# Patient Record
Sex: MALE | Race: WHITE | HISPANIC OR LATINO | URBAN - METROPOLITAN AREA
[De-identification: names, ages, dates, MRNs, and addresses within clinical notes are randomized per-mention and may not be internally consistent; named-entity substitution may affect disease eponyms.]

---

## 2020-01-01 ENCOUNTER — EMERGENCY (EMERGENCY)
Facility: HOSPITAL | Age: 30
LOS: 1 days | Discharge: HOME | End: 2020-01-01
Attending: EMERGENCY MEDICINE | Admitting: EMERGENCY MEDICINE
Payer: SELF-PAY

## 2020-01-01 VITALS
HEIGHT: 65 IN | HEART RATE: 81 BPM | SYSTOLIC BLOOD PRESSURE: 127 MMHG | OXYGEN SATURATION: 98 % | WEIGHT: 145.06 LBS | RESPIRATION RATE: 18 BRPM | DIASTOLIC BLOOD PRESSURE: 78 MMHG | TEMPERATURE: 98 F

## 2020-01-01 DIAGNOSIS — S01.81XA LACERATION WITHOUT FOREIGN BODY OF OTHER PART OF HEAD, INITIAL ENCOUNTER: ICD-10-CM

## 2020-01-01 PROCEDURE — 12013 RPR F/E/E/N/L/M 2.6-5.0 CM: CPT

## 2020-01-01 PROCEDURE — 71045 X-RAY EXAM CHEST 1 VIEW: CPT

## 2020-01-01 PROCEDURE — 70450 CT HEAD/BRAIN W/O DYE: CPT

## 2020-01-01 PROCEDURE — 99284 EMERGENCY DEPT VISIT MOD MDM: CPT | Mod: 25

## 2020-01-01 PROCEDURE — 71045 X-RAY EXAM CHEST 1 VIEW: CPT | Mod: 26

## 2020-01-01 PROCEDURE — 99053 MED SERV 10PM-8AM 24 HR FAC: CPT

## 2020-01-01 PROCEDURE — 70450 CT HEAD/BRAIN W/O DYE: CPT | Mod: 26

## 2020-01-01 PROCEDURE — 72125 CT NECK SPINE W/O DYE: CPT | Mod: 26

## 2020-01-01 PROCEDURE — 72125 CT NECK SPINE W/O DYE: CPT

## 2020-01-01 RX ORDER — TETANUS TOXOID, REDUCED DIPHTHERIA TOXOID AND ACELLULAR PERTUSSIS VACCINE, ADSORBED 5; 2.5; 8; 8; 2.5 [IU]/.5ML; [IU]/.5ML; UG/.5ML; UG/.5ML; UG/.5ML
0.5 SUSPENSION INTRAMUSCULAR ONCE
Refills: 0 | Status: COMPLETED | OUTPATIENT
Start: 2020-01-01 | End: 2020-01-01

## 2020-01-01 NOTE — ED PROVIDER NOTE - PROGRESS NOTE DETAILS
Satya, PGY-1: Received signout on this patient. Lac repaired, CTH/CT-cpsine negative. Patient sobering up, alert, interactive and answering questions appropriately. Will wait another hour or so before clearing c-spine. Nancy Dumont MD pt signed out to me pending laceration repair and reeval, pt accompanied w/ friends who will take him home, he is clinically sober, informed of nasal bone fracture, no nasal septal hematoma; stable for dc at this time
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
## 2020-01-01 NOTE — ED ADULT NURSE NOTE - IN THE PAST 12 MONTHS HAVE YOU USED DRUGS OTHER THAN THOSE REQUIRED FOR MEDICAL REASON?
2/22/2019         RE: Brian Torres  [address] [facility] [place] [address]        Dear Colleague,    Thank you for referring your patient, Brian Torres, to the [facility]. Please see a copy of my visit note below.    Brian Torres is a 19 year old year old male patient here today for f/u accutane for the treatment of recalcitrant acne . Pt has completed 4 months of accutane. Currently taking 40mg BID. Just finished 4 weeks of prednisone 10mg daily with great improvement. Taking slow Mag, calcium, and Vit D.  Is very happy with his improvement.  Pt has had acne for awhile. Has tried oral antibiotics, topical antibiotics, BPO and OTC with not much improvement. Modifying factors. Has some very dry skin. No mood changes. No other side effects noticed.  Patient has no other skin complaints today.  Remainder of the HPI, Meds, PMH, Allergies, FH, and SH was reviewed in chart.    Pertinent Hx:   Acne conglobata  Past Medical History:   Diagnosis Date     ADHD (attention deficit hyperactivity disorder)     off medications for the past 3 years       Family History   Problem Relation Age of Onset     Hypertension Mother      Depression Father      Unknown/Adopted Father      Arthritis Maternal Grandmother      Asthma Sister      Depression Sister        Social History     Socioeconomic History     Marital status: Single     Spouse name: Not on file     Number of children: Not on file     Years of education: Not on file     Highest education level: Not on file   Occupational History     Not on file   Social Needs     Financial resource strain: Not on file     Food insecurity:     Worry: Not on file     Inability: Not on file     Transportation needs:     Medical: Not on file     Non-medical: Not on file   Tobacco Use     Smoking status: Never Smoker     Smokeless tobacco: Never Used   Substance and Sexual Activity     Alcohol use: Yes     Drug use: No     Sexual activity: Yes      Partners: Female     Birth control/protection: Condom   Lifestyle     Physical activity:     Days per week: Not on file     Minutes per session: Not on file     Stress: Not on file   Relationships     Social connections:     Talks on phone: Not on file     Gets together: Not on file     Attends Faith service: Not on file     Active member of club or organization: Not on file     Attends meetings of clubs or organizations: Not on file     Relationship status: Not on file     Intimate partner violence:     Fear of current or ex partner: Not on file     Emotionally abused: Not on file     Physically abused: Not on file     Forced sexual activity: Not on file   Other Topics Concern     Parent/sibling w/ CABG, MI or angioplasty before 65F 55M? Not Asked   Social History Narrative     Not on file       Outpatient Encounter Medications as of 2019   Medication Sig Dispense Refill     MYORISAN 30 MG capsule TAKE 1 TAB BY MOUTH TWICE A DAY  0     predniSONE (DELTASONE) 10 MG tablet TAKE 1 TABLET BY MOUTH DAILY. 28 tablet 0     predniSONE (DELTASONE) 10 MG tablet 2 tabs po q day x 7 days then 1 tab po q day x 14 days. 28 tablet 0     hydrocortisone (ANUSOL-HC) 25 MG Suppository Place 1 suppository (25 mg) rectally 2 times daily (Patient not taking: Reported on 2019) 28 suppository 1     [] ISOtretinoin (ACCUTANE) 40 MG capsule Take 1 capsule (40 mg) by mouth 2 times daily iPLEDGE #: [id number] 60 capsule 0     [] ISOtretinoin (ACCUTANE) 40 MG capsule Take 1 capsule (40 mg) by mouth 2 times daily iPLEDGE #: [id number] 60 capsule 0     [] predniSONE (DELTASONE) 20 MG tablet Take 1 tablet (20 mg) by mouth daily for 14 days 14 tablet 0     No facility-administered encounter medications on file as of 2019.          Review Of Systems  Skin: As above  Eyes: negative  Ears/Nose/Throat: negative  Respiratory: No shortness of breath, dyspnea on exertion, cough, or hemoptysis  Cardiovascular:  negative  Gastrointestinal: negative  Genitourinary: negative  Musculoskeletal: negative  Neurologic: negative  Psychiatric: negative  Hematologic/Lymphatic/Immunologic: negative  Endocrine: negative      Objective:     NAD, WDWN, Alert & Oriented, Mood & Affect wnl, Vitals stable  Accompanied by self  Alert, oriented and in no acute distress    Eyes: Conjunctivae/lids:Normal   ENT: Lips,   MSK:Normal  Neuro/Psych: Orientation:Normal; Mood/Affect:Normal  Areas examined:face, neck, back, chest,   Findings: atrophic pink and flesh colored macules on face and back  Wd pink nodules on neck and back. Light brown/pink smooth macules    Assessment/Plan:  1. Acne conglobata, med monitoring, xerosis, PIH, acne scarring  Increase from 40mg BID to 60mg BID.     Total:7800  pts weight 230lbs  Target:75485 based on 150mg/kg  Ipledge: [id number]  Standing CBC, CMP and fasting lipids  Ipledge reviewed with patient and Ipledge consent form complete  Patient place in ipledge system  Isotretinoin (Accutane) education:  Do not share medication, do not donate blood, and if female do not get pregnant while on accutane.  Return to clinic 30 days  Dry lips and mouth, minor swelling of the eyelids or lips, crusty skin, nosebleeds, GI upset, or thinning of hair may occur. If any of these effects persist or worsen, tell your doctor or pharmacist promptly.   To relieve dry mouth, suck on (sugarless) hard candy or ice chips, chew (sugarless) gum, drink water.   Wear sunscreen daily, at least SPF 30 broad spectrum. Accutane is a photosensitizing medication and severe sun burns can occur.  Remember that your provider has prescribed this medication because he or she has judged that the benefit to you is greater than the risk of side effects. Many people using this medication do not have serious side effects.   Contact office immediately if you have any of these unlikely but serious side effects: mental/mood changes (e.g., depression,  aggressive  or violent behavior, and in rare cases, thoughts of suicide), tingling feeling in the skin, quick/severe sun sensitivity, back/joint/muscle pain, signs of infection (e.g., fever, persistent sore throat, painful swallowing, peeling skin on palms/soles.   Isotretinoin may infrequently cause disease of the pancreatitis, that may rarely be fatal. Stop taking this medication and contact office immediately if you develop: severe stomach pain severe or persistent GI upset,   Stop taking this medication and tell your doctor immediately if you develop these unlikely but very serious side effects: severe headache, vision changes, ear ringing, hearling loss, chest pain, yellowing eyes, skin, dark urine, severe diarrhea, rectal bleeding,   Seek immediate medical attention if you notice any symptoms of a serious allergic reaction.  Accutane is discussed fully with the patient. It is a very effective drug to treat acne vulgaris but has many potential significant side effects. Chief among these are teratogensis, hepatic injury, dyslipidemia and severe drying of the mucous membranes. All of these issues have been discussed in details. Monthly blood tests to monitor lipids and liver functions will be necessary. Expect painful dryness and/or fissuring around the lips, eyes, and other moist areas of the body. Balms may be protective. Contact lens may be too painful to wear temporarily while on this drug. Episodes of significant depression have been reported, including suicidal ideation and attempts in rare cases. It may also cause pseudotumor cerebri and hyperostosis. The patient will report any such changes in mood, depressive symptoms or suicidal thoughts, headaches, joint or bone pains. There is also a possible association with inflammatory bowel disease, although this is unproven at this point.       Again, thank you for allowing me to participate in the care of your patient.        Sincerely,        Edwina Agosto PA-C     No

## 2020-01-01 NOTE — ED PROVIDER NOTE - ATTENDING CONTRIBUTION TO CARE
Attending MD Brooks:  I personally have seen and examined this patient.  Resident note reviewed and agree on plan of care and except where noted.  See HPI, PE, and MDM for details.

## 2020-01-01 NOTE — ED PROVIDER NOTE - CARE PLAN
Principal Discharge DX:	Laceration of forehead Principal Discharge DX:	Laceration of forehead  Secondary Diagnosis:	Closed fracture of nasal bone, initial encounter

## 2020-01-01 NOTE — ED PROVIDER NOTE - NSFOLLOWUPINSTRUCTIONS_ED_ALL_ED_FT
You were seen in the Emergency Department for laceration.  1) Advance activity as tolerated.    2) Continue all previously prescribed medications as directed.    3) Follow up with your primary care physician in 24-48 hours - take copies of your results. Please remove the sutures in 5 days here in the ER, at urgent care or a primary care physician. You can follow-up with your ENT if desired for cosmetic appearance.   4) Return to the Emergency Department for worsening or persistent symptoms, and/or ANY NEW OR CONCERNING SYMPTOMS as described below.    Laceration    A laceration is a cut that goes through all of the layers of the skin and into the tissue that is right under the skin. Some lacerations heal on their own. Others need to be closed with skin adhesive strips, skin glue, stitches (sutures), or staples. Proper laceration care minimizes the risk of infection and helps the laceration to heal better.  If non-absorbable stitches or staples have been placed, they must be taken out within the time frame instructed by your healthcare provider.    SEEK IMMEDIATE MEDICAL CARE IF YOU HAVE ANY OF THE FOLLOWING SYMPTOMS: swelling around the wound, worsening pain, drainage from the wound, red streaking going away from your wound, inability to move finger or toe near the laceration, or discoloration of skin near the laceration.    Te vieron en el departamento de emergencias por laceración.  1) Avance de la actividad según lo tolerado.  2) Continúe con todos los medicamentos recetados previamente según las indicaciones.  3) Derik un seguimiento con shah médico de atención primaria en 24-48 horas: tome copias de peyton resultados. Retire las suturas en 5 días aquí en la ike de emergencias, en atención urgente o en un médico de atención primaria.  4) Regrese al Departamento de Emergencia por síntomas empeorados o persistentes, y / o CUALQUIER SÍNTOMA NUEVO O RELACIONADO jose se describe a continuación.    Laceración    Karlene laceración es un baldemar que atraviesa todas las capas de la piel y llega al tejido que está kenna debajo de la piel. Algunas laceraciones se curan solas. Otros deben cerrarse con tiras adhesivas para la piel, pegamento para la piel, puntos (suturas) o grapas. El cuidado apropiado de la laceración minimiza el riesgo de infección y ayuda a que la laceración sane mejor. Si se edwards colocado puntos de sutura o grapas no absorbibles, deben retirarse dentro del plazo indicado por shah proveedor de atención médica.    BUSQUE ATENCIÓN MÉDICA INMEDIATA SI TIENE ALGUNO DE LOS SÍNTOMAS SIGUIENTES: hinchazón alrededor de la herida, empeoramiento del dolor, drenaje de la herida, arnulfo hargrove que se alejan de la herida, incapacidad para  los dedos de las leonard o pies cerca de la laceración o decoloración de la piel cerca de la herida laceración. You were seen in the Emergency Department for head injury. You were found to have a forehead laceration and a nasal bone fracture   We recommend that you follow up with your primary care physician/ER/urgent care for suture removal in 5 days. Please seek medical care sooner if you develop, fevers, chills, pus from the wound or redness surrounding the wound.  We recommend that you follow up with ENT if you want repair of your nasal bone fracture.   We recommend that you rest, ice and take tylenol(650 mg up to three times a day)/motrin(600 mg up to three times a day) for your symptoms.       Laceration    A laceration is a cut that goes through all of the layers of the skin and into the tissue that is right under the skin. Some lacerations heal on their own. Others need to be closed with skin adhesive strips, skin glue, stitches (sutures), or staples. Proper laceration care minimizes the risk of infection and helps the laceration to heal better.  If non-absorbable stitches or staples have been placed, they must be taken out within the time frame instructed by your healthcare provider.    SEEK IMMEDIATE MEDICAL CARE IF YOU HAVE ANY OF THE FOLLOWING SYMPTOMS: swelling around the wound, worsening pain, drainage from the wound, red streaking going away from your wound, inability to move finger or toe near the laceration, or discoloration of skin near the laceration.    Te vieron en el departamento de emergencias por laceración.  1) Avance de la actividad según lo tolerado.  2) Continúe con todos los medicamentos recetados previamente según las indicaciones.  3) Derik un seguimiento con shah médico de atención primaria en 24-48 horas: tome copias de peyton resultados. Retire las suturas en 5 días aquí en la ike de emergencias, en atención urgente o en un médico de atención primaria.  4) Regrese al Departamento de Emergencia por síntomas empeorados o persistentes, y / o CUALQUIER SÍNTOMA NUEVO O RELACIONADO jose se describe a continuación.    Laceración    Karlene laceración es un baldemar que atraviesa todas las capas de la piel y llega al tejido que está kenna debajo de la piel. Algunas laceraciones se curan solas. Otros deben cerrarse con tiras adhesivas para la piel, pegamento para la piel, puntos (suturas) o grapas. El cuidado apropiado de la laceración minimiza el riesgo de infección y ayuda a que la laceración sane mejor. Si se edwards colocado puntos de sutura o grapas no absorbibles, deben retirarse dentro del plazo indicado por shah proveedor de atención médica.    BUSQUE ATENCIÓN MÉDICA INMEDIATA SI TIENE ALGUNO DE LOS SÍNTOMAS SIGUIENTES: hinchazón alrededor de la herida, empeoramiento del dolor, drenaje de la herida, arnulfo hargrove que se alejan de la herida, incapacidad para  los dedos de las leonard o pies cerca de la laceración o decoloración de la piel cerca de la herida laceración. You were seen in the Emergency Department for head injury. You were found to have a forehead laceration and a nasal bone fracture     We recommend that you follow up with your primary care physician/ER/urgent care for suture removal in 5 days. Please seek medical care sooner if you develop, fevers, chills, pus from the wound or redness surrounding the wound.  We recommend that you follow up with ENT if you want repair of your nasal bone fracture.     We recommend that you rest, ice and take tylenol(650 mg up to three times a day)/motrin(600 mg up to three times a day) for your symptoms.     In addition, you may have some concussion symptoms due to head injury, which include headache, irritability and difficulty sleeping. If needed, please follow-up with our concussion clinic. Please return to the ER if you begin experiencing loss of consciousness, severe headaches, changes in strength/sensation in arms or legs, slurred speech, changes in vision, seeing flashes/floaters.     Laceration    A laceration is a cut that goes through all of the layers of the skin and into the tissue that is right under the skin. Some lacerations heal on their own. Others need to be closed with skin adhesive strips, skin glue, stitches (sutures), or staples. Proper laceration care minimizes the risk of infection and helps the laceration to heal better.  If non-absorbable stitches or staples have been placed, they must be taken out within the time frame instructed by your healthcare provider.    SEEK IMMEDIATE MEDICAL CARE IF YOU HAVE ANY OF THE FOLLOWING SYMPTOMS: swelling around the wound, worsening pain, drainage from the wound, red streaking going away from your wound, inability to move finger or toe near the laceration, or discoloration of skin near the laceration.    Te vieron en el departamento de emergencias por danay lesión en la bryson. Se descubrió que tenía danay laceración en la frente y danay fractura de hueso nasal    Recomendamos que marylu un seguimiento con shah médico de atención primaria / ER / atención urgente para la extracción de suturas en 5 días. Busque atención médica antes si presenta fiebre, escalofríos, pus de la herida o enrojecimiento que rodea la herida. Recomendamos que realice un seguimiento con ENT si desea reparar shah fractura de hueso nasal.    Recomendamos que descanse, tome hielo y tome tylenol (650 mg hasta luis antonio veces al día) / motrin (600 mg hasta luis antonio veces al día) para peyton síntomas.    Además, puede tener algunos síntomas de conmoción cerebral debido a danay lesión en la bryson, que incluyen dolor de bryson, irritabilidad y dificultad para dormir. Si es necesario, marylu un seguimiento con nuestra clínica de conmoción cerebral. Regrese a la ike de emergencias si comienza a experimentar pérdida de conciencia, pavel de bryson severos, cambios en la fuerza / sensación en brazos o piernas, dificultad para hablar, cambios en la visión, gabby destellos / flotadores.    Laceración    Danay laceración es un baldemar que atraviesa todas las capas de la piel y llega al tejido que está kenna debajo de la piel. Algunas laceraciones se curan solas. Otros deben cerrarse con tiras adhesivas para la piel, pegamento para la piel, puntos (suturas) o grapas. El cuidado apropiado de la laceración minimiza el riesgo de infección y ayuda a que la laceración sane mejor. Si se edwards colocado puntos de sutura o grapas no absorbibles, deben retirarse dentro del plazo indicado por shah proveedor de atención médica.    BUSQUE ATENCIÓN MÉDICA INMEDIATA SI TIENE ALGUNO DE LOS SÍNTOMAS SIGUIENTES: hinchazón alrededor de la herida, empeoramiento del dolor, drenaje de la herida, arnulfo hargrove que se alejan de la herida, incapacidad para  los dedos de las leonard o pies cerca de la laceración o decoloración de la piel cerca de la herida laceración.

## 2020-01-01 NOTE — ED PROVIDER NOTE - NSFOLLOWUPCLINICS_GEN_ALL_ED_FT
[facility] - ENT  Otolaryngology (ENT)  [address] [place] [address]  Phone: [phone]  Fax:   Follow Up Time: Routine    [facility]  Otolaryngology (ENT)  [address] - [address] [place] [address]  Phone: [phone]  Fax:   Follow Up Time: Routine [facility] - ENT  Otolaryngology (ENT)  [address] [place] [address]  Phone: [phone]  Fax:   Follow Up Time: Routine    [facility]  Otolaryngology (ENT)  [address] - [address] [place] [address]  Phone: [phone]  Fax:   Follow Up Time: Routine

## 2020-01-01 NOTE — ED PROVIDER NOTE - PATIENT PORTAL LINK FT
You can access the FollowMyHealth Patient Portal offered by [facility] by registering at the following website: [email or web address]. By joining [facility]’s FollowMyHealth portal, you will also be able to view your health information using other applications (apps) compatible with our system.
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
## 2020-01-01 NOTE — ED PROVIDER NOTE - CARE PROVIDER_API CALL
Thai Campoverde  Concussion clinic (sports medicine)  [address] [facility], [address] [place] [address]  [phone]  Phone: (   )    -  Fax: (   )    -  Follow Up Time:
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
## 2020-01-01 NOTE — ED PROVIDER NOTE - PHYSICAL EXAMINATION
Gen: AAOx3, non-toxic  Head: NCAT  HEENT: EOMI, PERRL, oral mucosa moist, normal conjunctiva  Lung: CTAB, no respiratory distress, no wheezes/rhonchi/rales B/L, speaking in full sentences  CV: RRR, no murmurs, rubs or gallops  Abd: soft, NTND, no guarding, no CVA tenderness  MSK: no visible deformities, no midline spinal tenderness, no popeye tenderness  Neuro: No focal sensory or motor deficits  Skin: Warm, well perfused, no rash. 4cm laceration to the forehead.  Psych: normal affect.   ~Evan Xi PGY2 Gen: AAOx3, non-toxic  Head: NCAT  HEENT: EOMI, PERRL, oral mucosa moist, normal conjunctiva, no nasal hematomas noted  Lung: CTAB, no respiratory distress, no wheezes/rhonchi/rales B/L, speaking in full sentences  CV: RRR, no murmurs, rubs or gallops  Abd: soft, NTND, no guarding, no CVA tenderness  MSK: no visible deformities, no midline spinal tenderness, no popeye tenderness  Neuro: No focal sensory or motor deficits  Skin: Warm, well perfused, no rash. 4cm laceration to the forehead.  Psych: normal affect.   ~Evan Xi PGY2

## 2020-01-08 ENCOUNTER — EMERGENCY (EMERGENCY)
Facility: HOSPITAL | Age: 30
LOS: 1 days | Discharge: HOME | End: 2020-01-08
Attending: EMERGENCY MEDICINE
Payer: SELF-PAY

## 2020-01-08 VITALS
OXYGEN SATURATION: 99 % | RESPIRATION RATE: 18 BRPM | SYSTOLIC BLOOD PRESSURE: 111 MMHG | DIASTOLIC BLOOD PRESSURE: 69 MMHG | TEMPERATURE: 98 F | HEIGHT: 65 IN | HEART RATE: 69 BPM

## 2020-01-08 DIAGNOSIS — S01.81XA LACERATION WITHOUT FOREIGN BODY OF OTHER PART OF HEAD, INITIAL ENCOUNTER: ICD-10-CM

## 2020-01-08 PROCEDURE — G0463: CPT

## 2020-01-08 NOTE — ED PROVIDER NOTE - OBJECTIVE STATEMENT
30 yo male no pmh presenting for suture removal s/p lac to forehead repaired 7 days ago. Pt denies swelling, redness, discharge, pain, fevers or chills, no bleeding or purulence, no surrounding erythema. no nausea, vomiting, change sin vision, numbness, tingling, paresthesias. 30 yo male no pmh presenting for suture removal s/p lac to forehead repaired 7 days ago. Pt denies swelling, redness, discharge, pain, fevers or chills, no bleeding or purulence, no surrounding erythema. no nausea, vomiting, change sin vision, numbness, tingling, paresthesias.      Attending note. Patient was seen in fast track eye room.  Patient is here for suture removal. Patient sustained a laceration 8 days ago and received 5 sutures. Patient reports the wound is healing well without pain or drainage.

## 2020-01-08 NOTE — ED PROVIDER NOTE - PHYSICAL EXAMINATION
A&Ox3, NAD. NCAT. PERRL, EOMI. Skin: 5cm linear well healed laceration with 5 nylon sutures, + dry skin to laceration site, no redness, swelling, discharge, drainage, surrounding erythema or ttp. otherwise without rash. CN II-XII intact. Strength 5/5 UE/LE. Sensations intact throughout. A&Ox3, NAD. NCAT. PERRL, EOMI. Skin: 5cm linear well healed laceration with 5 nylon sutures, + dry skin to laceration site, no redness, swelling, discharge, drainage, surrounding erythema or ttp. otherwise without rash. CN II-XII intact. Strength 5/5 UE/LE. Sensations intact throughout.      Attending note. Well healing linear laceration in the upper forehead. No signs of infection.

## 2020-01-08 NOTE — ED PROVIDER NOTE - PATIENT PORTAL LINK FT
You can access the FollowMyHealth Patient Portal offered by [facility] by registering at the following website: [email or web address]. By joining [employer]’s FollowMyHealth portal, you will also be able to view your health information using other applications (apps) compatible with our system.

## 2020-01-08 NOTE — ED PROVIDER NOTE - NSFOLLOWUPINSTRUCTIONS_ED_ALL_ED_FT
- stay hydrated.  - follow up with your pcp in 1-2 days.  - keep area covered with lotion until completely, healed, use sunscreen or keep covered when going outside DAILY for the next 6-10 months  -wash daily gently with warm soap and water-pat dry.  - return if symptoms worsen, fevers, chills, redness, swelling, discharge, drainage, weakness, numbness/tingling, blurred vision, difficulty ambulating and all other concerns.

## 2020-01-12 DIAGNOSIS — Z48.02 ENCOUNTER FOR REMOVAL OF SUTURES: ICD-10-CM

## 2021-09-19 ENCOUNTER — EMERGENCY (EMERGENCY)
Facility: HOSPITAL | Age: 31
LOS: 1 days | Discharge: HOME | End: 2021-09-19
Attending: STUDENT IN AN ORGANIZED HEALTH CARE EDUCATION/TRAINING PROGRAM
Payer: SELF-PAY

## 2021-09-19 VITALS
TEMPERATURE: 99 F | OXYGEN SATURATION: 98 % | SYSTOLIC BLOOD PRESSURE: 122 MMHG | HEIGHT: 65 IN | WEIGHT: 145.06 LBS | RESPIRATION RATE: 18 BRPM | DIASTOLIC BLOOD PRESSURE: 82 MMHG | HEART RATE: 76 BPM

## 2021-09-19 LAB
ANION GAP SERPL CALC-SCNC: 14 MMOL/L — SIGNIFICANT CHANGE UP (ref 5–17)
BASOPHILS # BLD AUTO: 0.07 K/UL — SIGNIFICANT CHANGE UP (ref 0–0.2)
BASOPHILS NFR BLD AUTO: 0.7 % — SIGNIFICANT CHANGE UP (ref 0–2)
BUN SERPL-MCNC: 12 MG/DL — SIGNIFICANT CHANGE UP (ref 7–23)
CALCIUM SERPL-MCNC: 9.4 MG/DL — SIGNIFICANT CHANGE UP (ref 8.4–10.5)
CHLORIDE SERPL-SCNC: 101 MMOL/L — SIGNIFICANT CHANGE UP (ref 96–108)
CO2 SERPL-SCNC: 21 MMOL/L — LOW (ref 22–31)
CREAT SERPL-MCNC: 0.74 MG/DL — SIGNIFICANT CHANGE UP (ref 0.5–1.3)
CRP SERPL-MCNC: 19 MG/L — HIGH (ref 0–4)
EOSINOPHIL # BLD AUTO: 0.46 K/UL — SIGNIFICANT CHANGE UP (ref 0–0.5)
EOSINOPHIL NFR BLD AUTO: 4.8 % — SIGNIFICANT CHANGE UP (ref 0–6)
GLUCOSE SERPL-MCNC: 112 MG/DL — HIGH (ref 70–99)
HCT VFR BLD CALC: 39.8 % — SIGNIFICANT CHANGE UP (ref 39–50)
HGB BLD-MCNC: 13.3 G/DL — SIGNIFICANT CHANGE UP (ref 13–17)
IMM GRANULOCYTES NFR BLD AUTO: 0.4 % — SIGNIFICANT CHANGE UP (ref 0–1.5)
LYMPHOCYTES # BLD AUTO: 2.06 K/UL — SIGNIFICANT CHANGE UP (ref 1–3.3)
LYMPHOCYTES # BLD AUTO: 21.3 % — SIGNIFICANT CHANGE UP (ref 13–44)
MCHC RBC-ENTMCNC: 32.8 PG — SIGNIFICANT CHANGE UP (ref 27–34)
MCHC RBC-ENTMCNC: 33.4 GM/DL — SIGNIFICANT CHANGE UP (ref 32–36)
MCV RBC AUTO: 98.3 FL — SIGNIFICANT CHANGE UP (ref 80–100)
MONOCYTES # BLD AUTO: 0.99 K/UL — HIGH (ref 0–0.9)
MONOCYTES NFR BLD AUTO: 10.2 % — SIGNIFICANT CHANGE UP (ref 2–14)
NEUTROPHILS # BLD AUTO: 6.05 K/UL — SIGNIFICANT CHANGE UP (ref 1.8–7.4)
NEUTROPHILS NFR BLD AUTO: 62.6 % — SIGNIFICANT CHANGE UP (ref 43–77)
NRBC # BLD: 0 /100 WBCS — SIGNIFICANT CHANGE UP (ref 0–0)
PLATELET # BLD AUTO: 271 K/UL — SIGNIFICANT CHANGE UP (ref 150–400)
POTASSIUM SERPL-MCNC: 4 MMOL/L — SIGNIFICANT CHANGE UP (ref 3.5–5.3)
POTASSIUM SERPL-SCNC: 4 MMOL/L — SIGNIFICANT CHANGE UP (ref 3.5–5.3)
RBC # BLD: 4.05 M/UL — LOW (ref 4.2–5.8)
RBC # FLD: 12.3 % — SIGNIFICANT CHANGE UP (ref 10.3–14.5)
SODIUM SERPL-SCNC: 136 MMOL/L — SIGNIFICANT CHANGE UP (ref 135–145)
WBC # BLD: 9.67 K/UL — SIGNIFICANT CHANGE UP (ref 3.8–10.5)
WBC # FLD AUTO: 9.67 K/UL — SIGNIFICANT CHANGE UP (ref 3.8–10.5)

## 2021-09-19 PROCEDURE — 73060 X-RAY EXAM OF HUMERUS: CPT

## 2021-09-19 PROCEDURE — 80048 BASIC METABOLIC PNL TOTAL CA: CPT

## 2021-09-19 PROCEDURE — 82550 ASSAY OF CK (CPK): CPT

## 2021-09-19 PROCEDURE — 99053 MED SERV 10PM-8AM 24 HR FAC: CPT

## 2021-09-19 PROCEDURE — 73020 X-RAY EXAM OF SHOULDER: CPT

## 2021-09-19 PROCEDURE — 86140 C-REACTIVE PROTEIN: CPT

## 2021-09-19 PROCEDURE — 73060 X-RAY EXAM OF HUMERUS: CPT | Mod: 26,RT

## 2021-09-19 PROCEDURE — 99284 EMERGENCY DEPT VISIT MOD MDM: CPT

## 2021-09-19 PROCEDURE — 73030 X-RAY EXAM OF SHOULDER: CPT | Mod: 26,RT

## 2021-09-19 PROCEDURE — 85025 COMPLETE CBC W/AUTO DIFF WBC: CPT

## 2021-09-19 PROCEDURE — 99284 EMERGENCY DEPT VISIT MOD MDM: CPT | Mod: 25

## 2021-09-19 PROCEDURE — 73030 X-RAY EXAM OF SHOULDER: CPT

## 2021-09-19 RX ORDER — IBUPROFEN 200 MG
400 TABLET ORAL ONCE
Refills: 0 | Status: COMPLETED | OUTPATIENT
Start: 2021-09-19 | End: 2021-09-19

## 2021-09-19 RX ORDER — ACETAMINOPHEN 500 MG
975 TABLET ORAL ONCE
Refills: 0 | Status: COMPLETED | OUTPATIENT
Start: 2021-09-19 | End: 2021-09-19

## 2021-09-19 RX ORDER — OXYCODONE HYDROCHLORIDE 5 MG/1
5 TABLET ORAL ONCE
Refills: 0 | Status: DISCONTINUED | OUTPATIENT
Start: 2021-09-19 | End: 2021-09-19

## 2021-09-19 RX ADMIN — OXYCODONE HYDROCHLORIDE 5 MILLIGRAM(S): 5 TABLET ORAL at 03:10

## 2021-09-19 RX ADMIN — Medication 400 MILLIGRAM(S): at 03:11

## 2021-09-19 NOTE — ED ADULT NURSE NOTE - OBJECTIVE STATEMENT
30 year old male coming in for shoulder pain. as per pt no PMH. Patient is coming in comaplining of right shoulder pain, As per patient he woke up with right shoulder pain 3 days ago. The pain persisted and tonight got worse. Patient states the pain is so bad he is having difficulty moving his arm.  On arrival the patient is well appearing patient has full range of in all extremities except for the right arm in which he is hesitant dure to the pain. He is able to move the fingers and wrist. Pulses are +2 palpable and equal bilaterally + radial pulse.  Skin is warm and dry. Patiens denies the pain radiating. Denies any trauma. No chest pain or shortness of breath. Took Tylenol one hour prior to arrival.

## 2021-09-19 NOTE — ED PROVIDER NOTE - PROGRESS NOTE DETAILS
Sudeep PGY3: Patient reevaluated and feeling better. Improved ROM. Highly doubt septic joint with labs and exam at this time. VSS. Reviewed and discussed results with patient. Discussed importance of follow up and return precautions. Patient agrees with plan.

## 2021-09-19 NOTE — ED PROVIDER NOTE - PHYSICAL EXAMINATION
Physical Exam:  Gen: NAD, AOx3, non-toxic appearing, able to ambulate without assistance  Head: NCAT  HEENT: EOMI, PEERLA, normal conjunctiva, tongue midline, oral mucosa moist  Lung: CTAB, no respiratory distress, no wheezes/rhonchi/rales B/L, speaking in full sentences  CV: RRR, no murmurs, rubs or gallops, distal pulses 2+ b/l  MSK: limited active ROM of R shoulder, FROM with passive motion, no warmth/rash compared to other extremity no visible deformities  Neuro: No focal sensory or motor deficits  Skin: Warm, well perfused, no rash, no leg swelling  Psych: normal affect, calm

## 2021-09-19 NOTE — ED PROVIDER NOTE - ATTENDING CONTRIBUTION TO CARE
30 year old male presented to ED with right shoulder pain that's worse with movement. No fever, chills, rashes, chest pain, shortness of breath, N/V. Noted to have point tenderness around the AC joint areas. No overlying skin increased warmth, erythema. Likely musculoskeletal pain. Will obtain labs to assess for possible myositis. Low suspicion for septic joint. Plan: labs, x-ray, analgesia. reassess

## 2021-09-19 NOTE — ED PROVIDER NOTE - CLINICAL SUMMARY MEDICAL DECISION MAKING FREE TEXT BOX
31yo male p/w atraumatic R shoulder pain. Limited active ROM, FROM passively. No fever, well appearing. Doubt septic joint. Likely tendon vs rotator cuff injury/inflammation. XR's, labs, meds, reassess.

## 2021-09-19 NOTE — ED PROVIDER NOTE - NSFOLLOWUPCLINICS_GEN_ALL_ED_FT
[facility]  Sports Medicine  [address] [place] [address]  Phone: [phone]  Fax:

## 2021-09-19 NOTE — ED PROVIDER NOTE - NSFOLLOWUPINSTRUCTIONS_ED_ALL_ED_FT
- Continue all regular medications  - For pain, take tylenol or ibuprofen as directed on the packaging  - You will be referred to our sports medicine clinic for your shoulder pain, you may also call the number above to make an appointment  - You were given copies of labs and/or imaging results if applicable, please take them to your follow up appointments  - Return to the ER for fever, rash over your shoulder or any worsening symptoms or concerns

## 2021-09-19 NOTE — ED ADULT TRIAGE NOTE - CHIEF COMPLAINT QUOTE
patient woke with right shoulder pain 3 days ago.  pain is worsening and now patient unable to lift arm.  + radial pulse.  fingers warm and mobile

## 2021-09-19 NOTE — ED PROVIDER NOTE - PATIENT PORTAL LINK FT
You can access the FollowMyHealth Patient Portal offered by [facility] by registering at the following website: [email or web address]. By joining [employer]’s FollowMyHealth portal, you will also be able to view your health information using other applications (apps) compatible with our system.

## 2021-09-19 NOTE — ED PROVIDER NOTE - OBJECTIVE STATEMENT
31yo male p/w 3 days R shoulder pain worse with movement, started when he woke up. Taking tylenol with minimal relief. Denies fever, rash, injury, h/o shoulder surgery,
Complex psychosocial needs/coping issues